# Patient Record
Sex: FEMALE | Race: BLACK OR AFRICAN AMERICAN | NOT HISPANIC OR LATINO | Employment: PART TIME | URBAN - METROPOLITAN AREA
[De-identification: names, ages, dates, MRNs, and addresses within clinical notes are randomized per-mention and may not be internally consistent; named-entity substitution may affect disease eponyms.]

---

## 2020-04-15 ENCOUNTER — OFFICE VISIT - HEALTHEAST (OUTPATIENT)
Dept: FAMILY MEDICINE | Facility: CLINIC | Age: 35
End: 2020-04-15

## 2020-04-15 ENCOUNTER — OFFICE VISIT - HEALTHEAST (OUTPATIENT)
Dept: INTERNAL MEDICINE | Facility: CLINIC | Age: 35
End: 2020-04-15

## 2020-04-15 DIAGNOSIS — R21 RASH: ICD-10-CM

## 2020-04-15 DIAGNOSIS — L50.9 HIVES: ICD-10-CM

## 2020-04-15 LAB — HCG UR QL: NEGATIVE

## 2020-12-15 ENCOUNTER — OFFICE VISIT - HEALTHEAST (OUTPATIENT)
Dept: FAMILY MEDICINE | Facility: CLINIC | Age: 35
End: 2020-12-15

## 2020-12-15 DIAGNOSIS — Z30.09 ENCOUNTER FOR COUNSELING REGARDING CONTRACEPTION: ICD-10-CM

## 2020-12-23 ENCOUNTER — OFFICE VISIT - HEALTHEAST (OUTPATIENT)
Dept: FAMILY MEDICINE | Facility: CLINIC | Age: 35
End: 2020-12-23

## 2020-12-23 DIAGNOSIS — Z76.89 ENCOUNTER TO ESTABLISH CARE: ICD-10-CM

## 2020-12-23 DIAGNOSIS — Z30.9 CONTRACEPTION MANAGEMENT: ICD-10-CM

## 2020-12-23 DIAGNOSIS — Z30.017 NEXPLANON INSERTION: ICD-10-CM

## 2020-12-23 LAB — HCG UR QL: NEGATIVE

## 2020-12-23 RX ORDER — ETONOGESTREL 68 MG/1
1 IMPLANT SUBCUTANEOUS ONCE
Status: SHIPPED | COMMUNITY
Start: 2020-12-23

## 2020-12-23 ASSESSMENT — MIFFLIN-ST. JEOR: SCORE: 1351.99

## 2021-06-04 VITALS
HEART RATE: 73 BPM | WEIGHT: 142.7 LBS | TEMPERATURE: 98.6 F | OXYGEN SATURATION: 100 % | DIASTOLIC BLOOD PRESSURE: 73 MMHG | SYSTOLIC BLOOD PRESSURE: 111 MMHG

## 2021-06-05 VITALS
BODY MASS INDEX: 23.14 KG/M2 | HEART RATE: 68 BPM | WEIGHT: 144 LBS | SYSTOLIC BLOOD PRESSURE: 98 MMHG | HEIGHT: 66 IN | DIASTOLIC BLOOD PRESSURE: 60 MMHG

## 2021-06-07 NOTE — PROGRESS NOTES
Assessment/Plan:        1. Hives  Pregnancy (Beta-hCG, Qual), Urine    methylPREDNISolone (MEDROL, INOCENTE,) 4 mg tablet    fexofenadine (ALLEGRA) 180 MG tablet    Ambulatory referral to Allergy     1. Urticaria with more severe reaction that happened last night and more frequent urticaria episodes over the last 2 months.  SHe will take Medrolpak and Allegra and will schedule visit with Allergy specialist.  Urine pregnancy test is negative.    This note has been dictated using voice recognition software. Any grammatical or context distortions are unintentional and inherent to the software.      Return in about 2 weeks (around 4/29/2020) for Recheck.    There are no Patient Instructions on file for this visit.        Subjective:    Lamar Medina is a 35 y.o. female  here for    Chief Complaint   Patient presents with     Rash     Last night Lips, eyes swollen tongue went numb, [place] like something was in hrt throat, Still feels like something is in her throat     36 yo healthy female is here today to discuss hives and facial swelling that she had last night. She reports well know history of allergy to fish since her childhood. Over the last 2 months, she had frequent episodes of hives and itching in her arms, abdomen, legs, especially before bedtime. Last time, beside hives, she had swelling in her eyelids and upper lip and feeling of some throat tightness. She aid her voice was changed, but did not have trouble breathing or swallowing. She slept well. She cannot recall any change in her diet, habits, or contact with fish. No FH or allergies. She has 3 children and no one has hives.  She had similar episode in 2015 when they gave her Benadryl, but was making her sleepy.  Today, still has just a few hives areas in her arms, and swelling in her eyelids and lips subsided. She denies trouble breathing or swallowing, but has feeling that something is stuck in her throat when swallows.  No fever, chills, abdominal pain,  blood in the stool.  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 She does not have PCP. She is getting Depo provera every 3 months, last dose in Feb.    Social History     Socioeconomic History     Marital status: Single     Spouse name: Not on file     Number of children: Not on file     Years of education: Not on file     Highest education level: Not on file   Occupational History     Not on file   Social Needs     Financial resource strain: Not on file     Food insecurity     Worry: Not on file     Inability: Not on file     Transportation needs     Medical: Not on file     Non-medical: Not on file   Tobacco Use     Smoking status: Never Smoker     Smokeless tobacco: Never Used   Substance and Sexual Activity     Alcohol use: Not on file     Drug use: Not on file     Sexual activity: Not on file   Lifestyle     Physical activity     Days per week: Not on file     Minutes per session: Not on file     Stress: Not on file   Relationships     Social connections     Talks on phone: Not on file     Gets together: Not on file     Attends Confucianist service: Not on file     Active member of club or organization: Not on file     Attends meetings of clubs or organizations: Not on file     Relationship status: Not on file     Intimate partner violence     Fear of current or ex partner: Not on file     Emotionally abused: Not on file     Physically abused: Not on file     Forced sexual activity: Not on file   Other Topics Concern     Not on file   Social History Narrative     Not on file       No family history on file.  Review of Systems:     A 12 point comprehensive review of systems was negative except as noted in HPI.            Objective:    Physical Exam   /73   Pulse 73   Temp 98.6  F (37  C)   Wt 142 lb 11.2 oz (64.7 kg)   SpO2 100%     Constitutional: oriented to person, place, and time, appears well-nourished. No distress.   HENT:   Head: Normocephalic.   Mouth/Throat: Oropharynx is clear and  moist. Tongue is not swollen. Open airways, uvula in midline.  Eyes: Conjunctivae are normal. Pupils are equal, round, and reactive to light.   Neck: Normal range of motion. Neck supple.   Pulmonary/Chest: Effort normal, no wheezing.  Abdominal: Soft.   Musculoskeletal: Normal range of motion.   Neurological: alert and oriented to person, place, and time.  Skin: she has raised red urticarial rash on her left upper arm only.  Psychiatric:  normal mood and affect.    There is no problem list on file for this patient.      No current outpatient medications on file prior to visit.     No current facility-administered medications on file prior to visit.                Kody Galloway  4/15/2020

## 2021-06-13 NOTE — PROGRESS NOTES
"Assessment/plan   Lamar Medina is a 35 y.o. female  who is being evaluated via a billable telephone visit.      The patient has been notified of following:     \"This telephone visit will be conducted via a call between you and your physician/provider. We have found that certain health care needs can be provided without the need for a physical exam.  This service lets us provide the care you need with a short phone conversation.  If a prescription is necessary we can send it directly to your pharmacy.  If lab work is needed we can place an order for that and you can then stop by our lab to have the test done at a later time.    If during the course of the call the physician/provider feels a telephone visit is not appropriate, you will not be charged for this service.\"     Patient has given verbal consent to a Telephone visit? Yes  TT12m  Chief Complaint   Patient presents with     Consult     Nexplanon; currently on depo x7-8 yrs         Lamar was seen today for consult.    Diagnoses and all orders for this visit:    Encounter for counseling regarding contraception      Talked at great length on pros and cons of the procedure overall procedure and description also discussed with the patient  Patient will be coming on coming Wednesday to get her complete physical and procedure done  Subjective:      HPI: Lamar Medina is a 35 y.o. female who we talked over the phone over her current concerns .patient will be new to our practice      Lamar Medina is a 35 y.o. female . female who presents for contraception counseling patient currently using Depo shot she is almost end of her 3-month cycle and like to switch to Nexplanon wishing that we will get her into have the procedure done before this year and. The patient has no complaints today. The patient is sexually active. Pertinent past medical history: none.      I have personally  went over  patient's allergies, medications, past medical history, family " history, social history, rooming notes and problem list in detail and updated the patient record as necessary.      No past medical history on file.  No past surgical history on file.  Eicosapentaenoic acid and Fish containing products  Current Outpatient Medications   Medication Sig Dispense Refill     fexofenadine (ALLEGRA) 180 MG tablet Take 1 tablet (180 mg total) by mouth daily. 90 tablet 3     methylPREDNISolone (MEDROL, INOCENTE,) 4 mg tablet follow package directions 21 tablet 0     No current facility-administered medications for this visit.      No family history on file.    Patient Active Problem List   Diagnosis     Mitral valve stenosis     Personal history of other diseases of circulatory system     Screening for malignant neoplasm of cervix     Vaccine for viral hepatitis       Review of Systems   12 point comprehensive review of systems was negative except as noted and HPI     Social History     Social History Narrative     Not on file       Objective:   There were no vitals filed for this visit.     This note has been dictated using voice recognition software. Any grammatical or context distortions are unintentional and inherent to the software  Gayle Rico MD

## 2021-06-14 NOTE — PROGRESS NOTES
Lamar was seen today for contraception.    Diagnoses and all orders for this visit:    Contraception management  -     Pregnancy, Urine    Encounter to establish care  And due for her Pap smear not interested in doing it today we will schedule it by next year patient had some preventive visit labs done July 2020 at [facility] I did review all the records with the patient everything is in good range  Nexplanon insertion  Plan to remove it in next 3 years    Nexplanon insertion    Written consent done , risk and benefit discussed including irregular bleeding and also using protection for next 2 wk before it will be effective     Procedure Details   Urine pregnancy test was done  Negative , patient coming off her depo  .  The risks (including infection, bleeding, pain, ) and benefits of the procedure were explained to the patient and Written informed consent was obtained.      Skin was cleansed with Betadine.  Nexplanon inserted per package instruction no complications . Dressed with sterile strips  Patient tolerated procedure well.    Call or return to clinic prn if any concerns or questions .    Gayle Rico MD 12/23/2020 12:18 PM

## 2024-01-24 ENCOUNTER — OFFICE VISIT (OUTPATIENT)
Dept: INTERNAL MEDICINE | Facility: CLINIC | Age: 39
End: 2024-01-24
Payer: COMMERCIAL

## 2024-01-24 VITALS
SYSTOLIC BLOOD PRESSURE: 105 MMHG | HEIGHT: 66 IN | BODY MASS INDEX: 24.57 KG/M2 | OXYGEN SATURATION: 100 % | RESPIRATION RATE: 12 BRPM | TEMPERATURE: 98.6 F | HEART RATE: 67 BPM | WEIGHT: 152.9 LBS | DIASTOLIC BLOOD PRESSURE: 68 MMHG

## 2024-01-24 DIAGNOSIS — Z30.09 GENERAL COUNSELING AND ADVICE ON FEMALE CONTRACEPTION: ICD-10-CM

## 2024-01-24 DIAGNOSIS — Z30.46 ENCOUNTER FOR REMOVAL AND REINSERTION OF NEXPLANON: Primary | ICD-10-CM

## 2024-01-24 DIAGNOSIS — Z01.812 PRE-PROCEDURAL LABORATORY EXAMINATION: ICD-10-CM

## 2024-01-24 LAB — HCG UR QL: NEGATIVE

## 2024-01-24 PROCEDURE — 81025 URINE PREGNANCY TEST: CPT | Performed by: NURSE PRACTITIONER

## 2024-01-24 PROCEDURE — 11983 REMOVE/INSERT DRUG IMPLANT: CPT | Performed by: NURSE PRACTITIONER

## 2024-01-24 ASSESSMENT — PAIN SCALES - GENERAL: PAINLEVEL: NO PAIN (0)

## 2024-01-24 NOTE — PROGRESS NOTES
"CC: Nexplanon removal and insertion    HPI: The patient is a 39 year old female who presents for Nexplanon removal and insertion.  She wants it removed and replaced because efficacy has . She has been counseled on the risks, benefits and alternatives.  She understands that she may note changes in her menstrual cycle after insertion.  She will use back up contraception for the next 1 week.  She understands it can be left in for up to 3 years but can be removed sooner should it cause issues or she decides to become pregnant.  She also knows if it is working well, she can have one device removed and another inserted at the same time.  She was counseled that there could be changes with weight and mood as with any hormonal contraceptive.    She has heavy bleeding with Nexplanon in place. She used to get depo provera injections and did not have bleeding while on this medication. We reviewed options for contraception. She would like to proceed with placement of a new Nexplanon device.     Exam: /68 (BP Location: Right arm, Patient Position: Sitting, Cuff Size: Adult Regular)   Pulse 67   Temp 98.6  F (37  C) (Oral)   Resp 12   Ht 1.67 m (5' 5.75\")   Wt 69.4 kg (152 lb 14.4 oz)   LMP 01/15/2024 (Approximate)   SpO2 100%   BMI 24.87 kg/m     Body mass index is 24.87 kg/m .  The patient's right arm was palpated between the biceps and the triceps.  The device was palpable.  Her arm was cleaned with betadine swabs x3.  One cc of 1% lidocaine was infiltrated at the insertion site scar.  Scalpel was used to make a small incision at the insertion site.  The end of the device was grasped with a fine-tipped clamp.  The capsule was cut with scissors, and the device was removed.  Bleeding was minimal.   Three ccs of 1% lidocaine with epi were infiltrated in the same track of the removed implant.  Device was inserted atraumatically and deployed without difficulty.  The introducer was removed.  Bleeding was minimal. "  The patient could palpate the device without difficulty.  Bandage and then wrap dressing were placed.  The patient tolerated the insertion well.  She was instructed to leave the dressing on at least till tonight if not tomorrow morning.  The lot # is [id number]. Expiration is 11/2025. NDC is [id number]    Assessment: Contraception counseling   Successful Nexplanon removal and insertion insertion    Plan:  Follow up care discussed with the patient.  Call if there are any questions or concerns.  Card with insertion date and recommended removal given to patient.